# Patient Record
Sex: FEMALE | Race: WHITE | NOT HISPANIC OR LATINO | Employment: FULL TIME | URBAN - METROPOLITAN AREA
[De-identification: names, ages, dates, MRNs, and addresses within clinical notes are randomized per-mention and may not be internally consistent; named-entity substitution may affect disease eponyms.]

---

## 2017-12-05 ENCOUNTER — HOSPITAL ENCOUNTER (EMERGENCY)
Facility: OTHER | Age: 22
Discharge: HOME | End: 2017-12-05
Attending: EMERGENCY MEDICINE
Payer: COMMERCIAL

## 2017-12-05 VITALS
HEIGHT: 67 IN | OXYGEN SATURATION: 98 % | DIASTOLIC BLOOD PRESSURE: 65 MMHG | TEMPERATURE: 98 F | WEIGHT: 175 LBS | RESPIRATION RATE: 17 BRPM | HEART RATE: 88 BPM | SYSTOLIC BLOOD PRESSURE: 115 MMHG | BODY MASS INDEX: 27.47 KG/M2

## 2017-12-05 DIAGNOSIS — R00.2 PALPITATIONS: Primary | ICD-10-CM

## 2017-12-05 DIAGNOSIS — R00.0 TACHYCARDIA: ICD-10-CM

## 2017-12-05 LAB
ANION GAP SERPL CALC-SCNC: 8 MMOL/L
B-HCG UR QL: NEGATIVE
BASOPHILS # BLD AUTO: 0.02 K/UL
BASOPHILS NFR BLD: 0.2 %
BUN SERPL-MCNC: 9 MG/DL
CALCIUM SERPL-MCNC: 9 MG/DL
CHLORIDE SERPL-SCNC: 107 MMOL/L
CO2 SERPL-SCNC: 25 MMOL/L
CREAT SERPL-MCNC: 0.7 MG/DL
CTP QC/QA: YES
D DIMER PPP IA.FEU-MCNC: 0.35 MG/L FEU
DIFFERENTIAL METHOD: NORMAL
EOSINOPHIL # BLD AUTO: 0.1 K/UL
EOSINOPHIL NFR BLD: 0.7 %
ERYTHROCYTE [DISTWIDTH] IN BLOOD BY AUTOMATED COUNT: 12.4 %
EST. GFR  (AFRICAN AMERICAN): >60 ML/MIN/1.73 M^2
EST. GFR  (NON AFRICAN AMERICAN): >60 ML/MIN/1.73 M^2
GLUCOSE SERPL-MCNC: 93 MG/DL
HCT VFR BLD AUTO: 39.7 %
HGB BLD-MCNC: 13.4 G/DL
LYMPHOCYTES # BLD AUTO: 2.9 K/UL
LYMPHOCYTES NFR BLD: 29.2 %
MAGNESIUM SERPL-MCNC: 2.1 MG/DL
MCH RBC QN AUTO: 29.6 PG
MCHC RBC AUTO-ENTMCNC: 33.8 G/DL
MCV RBC AUTO: 88 FL
MONOCYTES # BLD AUTO: 0.7 K/UL
MONOCYTES NFR BLD: 7.1 %
NEUTROPHILS # BLD AUTO: 6.2 K/UL
NEUTROPHILS NFR BLD: 62.5 %
PLATELET # BLD AUTO: 217 K/UL
PMV BLD AUTO: 10.7 FL
POTASSIUM SERPL-SCNC: 3.8 MMOL/L
RBC # BLD AUTO: 4.52 M/UL
SODIUM SERPL-SCNC: 140 MMOL/L
TSH SERPL DL<=0.005 MIU/L-ACNC: 2.96 UIU/ML
WBC # BLD AUTO: 9.91 K/UL

## 2017-12-05 PROCEDURE — 93010 ELECTROCARDIOGRAM REPORT: CPT | Mod: ,,, | Performed by: INTERNAL MEDICINE

## 2017-12-05 PROCEDURE — 84443 ASSAY THYROID STIM HORMONE: CPT

## 2017-12-05 PROCEDURE — 80048 BASIC METABOLIC PNL TOTAL CA: CPT

## 2017-12-05 PROCEDURE — 81025 URINE PREGNANCY TEST: CPT | Performed by: EMERGENCY MEDICINE

## 2017-12-05 PROCEDURE — 25000003 PHARM REV CODE 250: Performed by: EMERGENCY MEDICINE

## 2017-12-05 PROCEDURE — 99284 EMERGENCY DEPT VISIT MOD MDM: CPT | Mod: 25

## 2017-12-05 PROCEDURE — 96360 HYDRATION IV INFUSION INIT: CPT

## 2017-12-05 PROCEDURE — 93005 ELECTROCARDIOGRAM TRACING: CPT

## 2017-12-05 PROCEDURE — 85379 FIBRIN DEGRADATION QUANT: CPT

## 2017-12-05 PROCEDURE — 85025 COMPLETE CBC W/AUTO DIFF WBC: CPT

## 2017-12-05 PROCEDURE — 83735 ASSAY OF MAGNESIUM: CPT

## 2017-12-05 RX ORDER — SODIUM CHLORIDE 9 MG/ML
1000 INJECTION, SOLUTION INTRAVENOUS
Status: COMPLETED | OUTPATIENT
Start: 2017-12-05 | End: 2017-12-05

## 2017-12-05 RX ORDER — SODIUM CHLORIDE 9 MG/ML
1000 INJECTION, SOLUTION INTRAVENOUS
Status: DISCONTINUED | OUTPATIENT
Start: 2017-12-05 | End: 2017-12-05

## 2017-12-05 RX ORDER — SUCRALFATE 1 G/10ML
1 SUSPENSION ORAL
Status: COMPLETED | OUTPATIENT
Start: 2017-12-05 | End: 2017-12-05

## 2017-12-05 RX ADMIN — SODIUM CHLORIDE 1000 ML: 0.9 INJECTION, SOLUTION INTRAVENOUS at 03:12

## 2017-12-05 RX ADMIN — SUCRALFATE 1 G: 1 SUSPENSION ORAL at 04:12

## 2017-12-05 NOTE — ED NOTES
Pt up to restroom, ambulating with steady gait, and erect posture, NAD noted. Upon returningto room Pt states she is feeling better, and denies any symptoms at this time.

## 2017-12-05 NOTE — ED TRIAGE NOTES
"Pt states he went out tonight, and had 1 drink, and began to feel "not well". Sattes she went to hotel, and went to bed, and woke up aprox. 30 min pta feeling worse. Describes symptoms as nausea, and heart racing, denies CP, and CO SOB. Respirations even and unlabored, NAD noted. Pt appears anxious.   "

## 2017-12-05 NOTE — ED NOTES
Pt updated on POC, bed locked and low, rails up Xs 1, friend at bedside, call bell given to patient, and again informed of need for urine, will continue to monitor.

## 2017-12-05 NOTE — ED PROVIDER NOTES
Encounter Date: 12/5/2017    SCRIBE #1 NOTE: I, Gloria Valverde, am scribing for, and in the presence of, Dr. Hanson.       History     Chief Complaint   Patient presents with    Palpitations     Pt here from [place] for pleasure.  Went out this evening, had one drink (Martini) around 9:30, and then around midnight, she felt like she was having palpitations.  Went to bed and when she woke up (because she didn't feel good) and realized that she was not feeling any better. Did have SOB and nausea but has since subsided     Time seen by provider: 3:16 AM    This is a 22 y.o. female who presents with complaint of palpitations which started approximately four hours ago. Sudden onset occurred when the patient arrived to her hotel room after traveling from [place]. The patient states that she attempted to fall asleep after onset of palpitations but was unable to do so. She reports associated nausea and light-headedness, but denies chest pain, shortness of breath, leg swelling, fever, chills, nausea, vomiting, diaphoresis, cough, congestion, abdominal pain, diarrhea, or constipation. Patient states that symptoms were slightly alleviated with ambulating and getting fresh air outside. She reports feeling fine prior to onset of symptoms and notes that nausea and light-headedness are currently resolved. Patient says that symptoms are consistent with previous episodes of palpitations with the most recent episode experienced one year ago. The patient also notes that her baseline heart rate is in the 90s. She admits to use of ETOH (one martini) prior to onset of symptoms and denies recent long periods of immobility in the last few months. Patient has no additional complaints.      The history is provided by the patient.     Review of patient's allergies indicates:  No Known Allergies  History reviewed. No pertinent past medical history.  History reviewed. No pertinent surgical history.  History reviewed. No pertinent family  history.  Social History   Substance Use Topics    Smoking status: Never Smoker    Smokeless tobacco: Not on file    Alcohol use Yes      Comment: socially     Review of Systems   Constitutional: Negative for fever.   HENT: Negative for congestion and sore throat.    Respiratory: Negative for cough and shortness of breath.    Cardiovascular: Positive for palpitations. Negative for chest pain and leg swelling.   Gastrointestinal: Positive for nausea (resolved). Negative for abdominal pain, constipation, diarrhea and vomiting.   Genitourinary: Negative for dysuria.   Musculoskeletal: Negative for back pain.   Skin: Negative for rash.   Neurological: Positive for light-headedness (resolved). Negative for weakness.   Hematological: Does not bruise/bleed easily.       Physical Exam     Initial Vitals [12/05/17 0305]   BP Pulse Resp Temp SpO2   (!) 126/59 104 16 98.3 °F (36.8 °C) 99 %      MAP       81.33         Physical Exam    Constitutional: She appears well-developed and well-nourished. She is not diaphoretic. No distress.   HENT:   Head: Normocephalic and atraumatic.   Mouth/Throat: Oropharynx is clear and moist.   Eyes: EOM are normal. Pupils are equal, round, and reactive to light. Right eye exhibits no discharge. Left eye exhibits no discharge.   Neck: Normal range of motion. Neck supple.   Cardiovascular: Regular rhythm, normal heart sounds and intact distal pulses. Exam reveals no gallop and no friction rub.    No murmur heard.  Tachycardic.   Pulmonary/Chest: Breath sounds normal. No respiratory distress. She has no wheezes. She has no rhonchi. She has no rales.   Abdominal: Soft. Bowel sounds are normal. She exhibits no distension. There is no tenderness. There is no rebound and no guarding.   Musculoskeletal: Normal range of motion. She exhibits no edema or tenderness.   No leg edema. No calf tenderness. No palpable masses or cords.   Neurological: She is alert and oriented to person, place, and time.    Skin: Skin is warm and dry. Capillary refill takes less than 2 seconds. No rash and no abscess noted. No erythema. No pallor.   Psychiatric: She has a normal mood and affect. Her behavior is normal. Judgment and thought content normal.         ED Course   Procedures  Labs Reviewed   CBC W/ AUTO DIFFERENTIAL   BASIC METABOLIC PANEL   MAGNESIUM   TSH   D DIMER, QUANTITATIVE   POCT URINE PREGNANCY     EKG Readings: (Independently Interpreted)   Initial Reading: No STEMI.   EKG Reading- Sinus tachycardia at a rate of 101 bpm. No ST-T wave changes. No STEMI.     Imaging Results    None            Medical Decision Making:   Independently Interpreted Test(s):   I have ordered and independently interpreted EKG Reading(s) - see prior notes  Clinical Tests:   Lab Tests: Ordered and Reviewed  Medical Tests: Ordered and Reviewed    Additional MDM:   Comments: 22-year-old healthy female presents with complaint of palpitations.  Upon arrival she was minimally tachycardic.  EKG was consistent with sinus tachycardia.  Exam was unremarkable.  Labs including CBC, BMP, magnesium, d-dimer, and TSH were obtained and without significant abnormalities.  She received 1 L of IV fluids and upon reassessment she reported her palpitations had resolved and she was feeling fine except for feeling heartburn.  Prior to discharge she received Carafate and the heart burn resolved.  Patient was discharged symptom-free.  She was instructed to follow-up with her primary care doctor upon return home or to return to the emergency department here for any worsening symptoms while still in town..          Scribe Attestation:   Scribe #1: I performed the above scribed service and the documentation accurately describes the services I performed. I attest to the accuracy of the note.    Attending Attestation:           Physician Attestation for Scribe:  Physician Attestation Statement for Scribe #1: I, Dr. Hanson, reviewed documentation, as scribed by Gloria  Nicolle in my presence, and it is both accurate and complete.                 ED Course      Clinical Impression:     1. Palpitations    2. Tachycardia                                 Flor Hanson MD  12/05/17 0618